# Patient Record
Sex: MALE | Race: WHITE | NOT HISPANIC OR LATINO | URBAN - METROPOLITAN AREA
[De-identification: names, ages, dates, MRNs, and addresses within clinical notes are randomized per-mention and may not be internally consistent; named-entity substitution may affect disease eponyms.]

---

## 2023-01-01 ENCOUNTER — PATIENT OUTREACH (OUTPATIENT)
Dept: ADMINISTRATIVE | Facility: CLINIC | Age: 68
End: 2023-01-01

## 2023-03-11 PROBLEM — R74.01 TRANSAMINITIS: Status: ACTIVE | Noted: 2023-01-01

## 2023-03-11 PROBLEM — R79.89 ELEVATED TROPONIN: Status: ACTIVE | Noted: 2023-01-01

## 2023-03-11 PROBLEM — I50.9 ACUTE EXACERBATION OF CHF (CONGESTIVE HEART FAILURE): Status: ACTIVE | Noted: 2023-01-01

## 2023-03-11 PROBLEM — I48.91 ATRIAL FIBRILLATION WITH RVR: Status: ACTIVE | Noted: 2023-01-01

## 2023-03-12 PROBLEM — F41.9 ANXIETY: Status: ACTIVE | Noted: 2023-01-01

## 2023-03-12 PROBLEM — J90 PLEURAL EFFUSION: Status: ACTIVE | Noted: 2023-01-01

## 2023-03-12 PROBLEM — N17.9 AKI (ACUTE KIDNEY INJURY): Status: ACTIVE | Noted: 2023-01-01

## 2023-03-12 PROBLEM — J18.9 COMMUNITY ACQUIRED PNEUMONIA OF RIGHT LOWER LOBE OF LUNG: Status: ACTIVE | Noted: 2023-01-01

## 2023-03-14 PROBLEM — F41.9 ANXIETY: Status: RESOLVED | Noted: 2023-01-01 | Resolved: 2023-01-01

## 2023-03-14 PROBLEM — N17.9 AKI (ACUTE KIDNEY INJURY): Status: RESOLVED | Noted: 2023-01-01 | Resolved: 2023-01-01

## 2023-03-14 PROBLEM — R79.89 ELEVATED TROPONIN: Status: RESOLVED | Noted: 2023-01-01 | Resolved: 2023-01-01

## 2023-03-14 PROBLEM — R07.9 CHEST PAIN: Status: ACTIVE | Noted: 2023-01-01

## 2023-03-14 PROBLEM — J90 PLEURAL EFFUSION: Status: RESOLVED | Noted: 2023-01-01 | Resolved: 2023-01-01

## 2023-03-14 PROBLEM — R00.2 PALPITATIONS: Status: ACTIVE | Noted: 2023-01-01

## 2023-03-14 PROBLEM — R00.2 PALPITATIONS: Status: RESOLVED | Noted: 2023-01-01 | Resolved: 2023-01-01

## 2023-03-14 PROBLEM — R06.02 SOB (SHORTNESS OF BREATH): Status: ACTIVE | Noted: 2023-01-01

## 2023-03-14 PROBLEM — J18.9 COMMUNITY ACQUIRED PNEUMONIA OF RIGHT LOWER LOBE OF LUNG: Status: RESOLVED | Noted: 2023-01-01 | Resolved: 2023-01-01

## 2023-03-14 PROBLEM — R07.9 CHEST PAIN: Status: RESOLVED | Noted: 2023-01-01 | Resolved: 2023-01-01

## 2023-03-14 PROBLEM — R06.02 SOB (SHORTNESS OF BREATH): Status: RESOLVED | Noted: 2023-01-01 | Resolved: 2023-01-01

## 2023-03-14 PROBLEM — R74.01 TRANSAMINITIS: Status: RESOLVED | Noted: 2023-01-01 | Resolved: 2023-01-01

## 2023-03-15 NOTE — PROGRESS NOTES
C3 nurse attempted to contact [facility] for a TCC post hospital discharge follow up call. No answer. No voicemail available. [facility] on 04/11/23 @ 0305.

## 2023-04-11 PROBLEM — I48.91 ATRIAL FIBRILLATION: Status: ACTIVE | Noted: 2023-01-01

## 2023-04-11 PROBLEM — J96.10 CHRONIC RESPIRATORY FAILURE: Status: ACTIVE | Noted: 2023-01-01

## 2023-04-11 PROBLEM — I50.43 ACUTE ON CHRONIC COMBINED SYSTOLIC AND DIASTOLIC HEART FAILURE: Status: ACTIVE | Noted: 2023-01-01

## 2023-04-12 PROBLEM — K59.00 CONSTIPATION: Status: ACTIVE | Noted: 2023-01-01

## 2023-04-12 PROBLEM — E03.9 HYPOTHYROIDISM: Status: ACTIVE | Noted: 2023-01-01

## 2023-04-14 PROBLEM — N40.0 BPH (BENIGN PROSTATIC HYPERPLASIA): Status: ACTIVE | Noted: 2023-01-01

## 2023-04-15 PROBLEM — B18.2 CHRONIC HEPATITIS C WITHOUT HEPATIC COMA: Status: ACTIVE | Noted: 2023-01-01

## 2023-04-16 PROBLEM — I27.81 COR PULMONALE: Status: ACTIVE | Noted: 2023-01-01

## 2023-04-16 PROBLEM — G93.40 ACUTE ENCEPHALOPATHY: Status: ACTIVE | Noted: 2023-01-01

## 2023-04-16 PROBLEM — K72.91 HEPATIC COMA/ENCEPHALOPATHY: Status: ACTIVE | Noted: 2023-01-01

## 2023-04-16 PROBLEM — J96.01 ACUTE HYPOXEMIC RESPIRATORY FAILURE: Status: ACTIVE | Noted: 2023-01-01

## 2023-04-16 PROBLEM — S36.119A INJURY TO LIVER: Status: ACTIVE | Noted: 2023-01-01

## 2023-04-16 PROBLEM — R04.2 HEMOPTYSIS: Status: ACTIVE | Noted: 2023-01-01

## 2023-04-16 PROBLEM — I95.0 IDIOPATHIC HYPOTENSION: Status: ACTIVE | Noted: 2023-01-01

## 2023-04-16 PROBLEM — K92.2 GASTROINTESTINAL HEMORRHAGE: Status: ACTIVE | Noted: 2023-01-01

## 2023-04-16 PROBLEM — R06.03 RESPIRATORY DISTRESS: Status: ACTIVE | Noted: 2023-01-01

## 2023-04-16 PROBLEM — R57.9 SHOCK: Status: ACTIVE | Noted: 2023-01-01

## 2023-04-16 PROBLEM — R04.89 PULMONARY ALVEOLAR HEMORRHAGE: Status: ACTIVE | Noted: 2023-01-01

## 2023-04-16 PROBLEM — K72.01 ACUTE LIVER FAILURE WITH HEPATIC COMA: Status: ACTIVE | Noted: 2023-01-01

## 2023-04-16 PROBLEM — R04.0 EPISTAXIS: Status: ACTIVE | Noted: 2023-01-01

## 2023-04-29 PROBLEM — F32.9 MAJOR DEPRESSIVE DISORDER WITH SINGLE EPISODE: Status: ACTIVE | Noted: 2023-04-29

## 2023-04-29 PROBLEM — F32.3 CURRENT SEVERE EPISODE OF MAJOR DEPRESSIVE DISORDER WITH PSYCHOTIC FEATURES WITHOUT PRIOR EPISODE: Status: ACTIVE | Noted: 2023-04-29
